# Patient Record
Sex: MALE | URBAN - METROPOLITAN AREA
[De-identification: names, ages, dates, MRNs, and addresses within clinical notes are randomized per-mention and may not be internally consistent; named-entity substitution may affect disease eponyms.]

---

## 2017-02-22 NOTE — PATIENT DISCUSSION
(H10.021) Other mucopurulent conjunctivitis, right eye - Assesment : Examination revealed other mucopurulent conjunctivitis. Likely bacterial. - Plan : Start Tobradex tid OD for 1 week. E-Rx sent to pharmacy. Can use in OS if symptoms start in OS. Advised Pt to wash hands after touching face and eyes, do not share towels or pillows. Advised may take a few weeks to resolve completely. Call if worsens, does not improve, or new symptoms or vision changes occur. RTC in June for Exam and RAUL [address] 74, sooner if problems or changes occur.

## 2017-02-22 NOTE — PATIENT DISCUSSION
(E74.323) Keratoconjunct sicca, not specified as Sjogren's, bilateral - Assesment : Examination revealed Dry Eye Syndrome - Plan : Monitor for changes. Recommended ATs daily 3-4 times per day and Gel ATs qhs. Systane Balance sample and Gel coupon given today. Continue Restasis bid OU.

## 2017-03-01 NOTE — PATIENT DISCUSSION
(O95.723) Edema of right lower eyelid - Assesment : Examination revealed edema of the eyelid vs allergy to Tobradex. - Plan : Discontinue Tobradex and Restasis. Continue hot compresses and use PF ATs daily 3-4 times. PF Refresh Optive Advance sample given. Call if worsens, does not improve, or new symptoms, or vision changes occur. RTC in 1 week for f/u, sooner if problems or changes.

## 2017-03-01 NOTE — PATIENT DISCUSSION
(Q27.093) Keratoconjunct sicca, not specified as Sjogren's, bilateral - Assesment : Examination revealed Dry Eye Syndrome  Pt uses Restasis but has not noticed improvement. - Plan : Monitor for changes. Discontinue Restasis as not helping and may be irritating. Continue hot compresses and PF ATs. Discussed possibility of starting Avenova Spray or Pt may consider Lid Hygiene Procedure with Shikha Salazar. Information given to Pt about Lid Hygiene Treatment.

## 2017-03-07 NOTE — PATIENT DISCUSSION
(H01.001) Unspecified blepharitis right upper eyelid - Assesment : Examination revealed Blepharitis. - Plan : RTC for [place] with Jenny Alarcon- discussed with pt, pt wishes to proceed.

## 2017-03-07 NOTE — PATIENT DISCUSSION
(U77.942) Keratoconjunct sicca, not specified as Sjogren's, bilateral - Assesment : Examination revealed Dry Eye Syndrome with mild corneal staining today. Pt states Restasis has not given much relief. - Plan : Monitor for changes. Advised patient to call our office with decreased vision or increased symptoms. D/C Restasis. Continue Systane- increased to TID-QID OU.

## 2017-03-07 NOTE — PATIENT DISCUSSION
(H01.004) Unspecified blepharitis left upper eyelid - Assesment : Examination revealed Blepharitis. - Plan : see plan #3.

## 2017-03-07 NOTE — PATIENT DISCUSSION
(D85.392) Edema of right lower eyelid - Assesment : Examination revealed edema of the eyelid vs allergy to Tobradex- now resolved off of Tobradex. - Plan : Monitor for changes. Pt to call with increased symptoms or decreased vision or other problems. Stay of Tobradex. D/C Restasis. Continue Systane- increased to TID-QID OU. RTC for Lid Hygiene Treatment.

## 2017-03-09 NOTE — PATIENT DISCUSSION
(H01.004) Unspecified blepharitis left upper eyelid - Assesment : Examination revealed Blepharitis.  - Plan : see plan #1

## 2017-03-09 NOTE — PATIENT DISCUSSION
(H01.001) Unspecified blepharitis right upper eyelid - Assesment : Examination revealed Blepharitis. - Plan : Lid Hygiene tx/Meibomian gland expression performed in minor surgery today. Pt to call with increased symptoms, decreased vision, or other ocular problems. Eder mask OU QD x 2-4 weeks, then PRN. Blephadex foam OU QD x 2-4 weeks, then PRN. Continue Systane OU TID-QID. EZ Tears Vitamin 1 PO QD. RTC for next Lid Hygiene tx OU in 4-5 mos. RTC as scheduled 6/2017 with Levine, Susan. \[facility].\"".

## 2017-03-13 NOTE — PATIENT DISCUSSION
(H01.001) Unspecified blepharitis right upper eyelid - Assesment : Examination revealed Blepharitis. Lid hygiene treatment performed 3/9/17. Patient reports swelling and itching of eyelids with Blephadex. Recommend steroid amy for eye to help with swelling and itching. Blephadex sensitivity. - Plan : Pt to call with increased symptoms, decreased vision, or other ocular problems. Start Maxitrol amy BID OU to lids and some inside the eye. Use for 4 days, then D/C Use cool soaks for now. When swelling decreases, start warm compresses. Eder mask OU QD x 2-4 weeks, then PRN. D/C Blephadex foam OU Continue Systane OU TID-QID. EZ Tears Vitamin 1 PO QD. RTC for next Lid Hygiene tx OU in 4-5 mos with Ocusoft Foam. RTC as scheduled 6/2017 with [facility].

## 2017-04-03 NOTE — PATIENT DISCUSSION
(Q71.152) Keratoconjunct sicca, not specified as Sjogren's, bilateral - Assesment : Examination revealed Dry Eye Syndrome- improved with EZ Tears Vitamin. - Plan : Monitor for changes. Advised patient to call our office with decreased vision or increased symptoms. Continue EZ Tears Vit 1 PO QD. Continue AT's OU TID-QID,-try Thera Tears (complains of film over va w/Systane), sample given.

## 2017-04-03 NOTE — PATIENT DISCUSSION
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 - Plan : Monitor for changes. Pt to call with increased symptoms, decreased vision, or other problems. EES amy OU QHS x 1 week. Continue Eder Mask OU QD-BID. RTC as scheduled for EXAM 6/2017 with [facility]. RTC as scheduled for Lid Hygiene Tx (w/Ocusoft foam).

## 2017-04-11 NOTE — PATIENT DISCUSSION
(U11.488) Keratoconjunct sicca, not specified as Sjogren's, bilateral - Assesment : Examination revealed Dry Eye Syndrome- Has been taking EZ Tears Vitamin for about 3 weeks. - Plan : Monitor for changes. Continue EZ Tears Vit 1 PO QD. Continue AT's OU TID-QID,- recommended trying Thera Tears.

## 2017-04-11 NOTE — PATIENT DISCUSSION
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 Pt felt Systane Ultra was too oily and felt Genteal was too oily also. Pt tried Zaditor in past. Pt has been taking EZ Tears Supplements for about 3 weeks. - Plan : Monitor for changes. Start Doxycyline 50 mg PO qdaily. E-Rx sent to pharmacy. Advised Doxy can cause sensitivity to the sun and should use sun protection while taking. Start Bepreve bid OU, sample given. Continue hot compresses and follow with Avenova bid to Lids, sample given today. Call if symptoms worsen, do not improve or new symptoms or vision changes occur. RTC in 3-4 weeks for f/u, sooner if problems.

## 2017-04-14 NOTE — PATIENT DISCUSSION
(O60.053) Keratoconjunct sicca, not specified as Sjogren's, bilateral - Assesment : Examination revealed Dry Eye Syndrome  Has been taking EZ Tears Vitamin for about 3 weeks. - Plan : Monitor for changes. Continue EZ Tears Supplements. Continue PF AT's at least TID-QID. PF Refresh Optive Advance sample given and coupon given today.

## 2017-04-14 NOTE — PATIENT DISCUSSION
(L03.211) Cellulitis of face - Assesment : Pre-septal cellulitis left- no orbital involvement at this time. - Plan : Stop Avenova and Doxycycline. Start Keflex 500mg PO bid for one week. Warned of symptoms of worsening such as fever, pain, proptosis, double vision to call or go to ER. E-Rx sent to the pharmacy. Stress importance of calling with worsening or other symptoms warned about. RTC on Monday for f/u, sooner if problems or changes occur.

## 2017-04-14 NOTE — PATIENT DISCUSSION
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 Pt has been taking EZ Tears Supplements for about 3 weeks. Pt has tried Maxitrol and Tobradex without improvement and Lid Hygiene Treatment without improvement, had allergic reaction to Blephadex Foam. - Plan : Monitor for changes. Hold Doxycyline and [place] for now. Refer to Dr. Supriya Yang for evaluation and possible treatment.

## 2017-04-18 NOTE — PATIENT DISCUSSION
(I71.555) Keratoconjunct sicca, not specified as Sjogren's, bilateral - Assesment : Examination revealed Dry Eye Syndrome - Pt noticing some improvement in symptoms. Pt reports uses hot compresses bid and uses Avenova after hot compresses bid, Refresh Optive Advance 2-3 times per day, EZ tears, and GenTeal Gel qhs. - Plan : Monitor for changes. Continue EZ Tears Supplements, PF AT's, GenTeal Gel qhs, and hot compresses followed by Avenova bid.

## 2017-04-18 NOTE — PATIENT DISCUSSION
(L03.211) Cellulitis of face - Assesment : Pre-septal cellulitis left- much improved today, almost resolved. Will complete the week of Keflex. - Plan : Monitor. Continue Keflex 500mg PO bid for the total of one week. Call if symptoms worsen or new symptoms or vision changes occur. RTC in 2 weeks for f/u, sooner if problems or changes occur.

## 2017-04-18 NOTE — PATIENT DISCUSSION
(H01.012) Ulcerative blepharitis right lower eyelid - Assesment : Examination revealed Ulcerative Blepharitis. Pt noticing some improvement in symptoms. Pt reports uses hot compresses bid and uses Avenova after hot compresses bid, Refresh Optive Advance 2-3 times per day, EZ tears, and GenTeal Gel qhs. - Plan : Monitor for changes. Continue EZ Tears Supplements, PF AT's, GenTeal Gel qhs, and hot compresses followed by Avenova bid. RTC in 2 weeks for f/u, sooner if problems.

## 2017-04-18 NOTE — PATIENT DISCUSSION
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 Pt noticing some improvement in symptoms. Pt reports uses hot compresses bid and uses Avenova after hot compresses bid, Refresh Optive Advance 2-3 times per day, EZ tears, and GenTeal Gel qhs. - Plan : Monitor for changes. Continue EZ Tears Supplements, PF AT's, GenTeal Gel qhs, and hot compresses followed by Avenova bid. RTC in 2 weeks for f/u, sooner if problems.

## 2017-06-05 NOTE — PATIENT DISCUSSION
(E11.9) Type 2 diabetes mellitus without complications - Assesment : Patient has type II diabetes with no ocular complications. - Plan : Continue care with PCP. Monitor blood sugar and A1C levels for changes. Recommended yearly dilated eye exams to monitor for ocular changes. Letter sent to PCP with today's findings.

## 2017-06-05 NOTE — PATIENT DISCUSSION
(K83.584) Other secondary cataract, bilateral - Assesment : Mild posterior capsule opacification present. - Plan : Monitor for Changes. Advised patient to call our office with decreased vision or increased symptoms. RTC in 1 year for Exam, sooner if problems or changes occur.

## 2017-06-05 NOTE — PATIENT DISCUSSION
(Y92.589) Keratoconjunct sicca, not specified as Sjogren's, bilateral - Assesment : Examination revealed Dry Eye Syndrome. Pt reports much improvement in symptoms since started EZ Tears supplements. - Plan : Monitor for changes. Continue EZ Tears Supplements, PF AT's, GenTeal Gel qhs, and hot compresses followed by Avenova bid.

## 2019-05-13 ENCOUNTER — IMPORTED ENCOUNTER (OUTPATIENT)
Dept: URBAN - METROPOLITAN AREA CLINIC 9 | Facility: CLINIC | Age: 59
End: 2019-05-13

## 2019-10-01 ENCOUNTER — IMPORTED ENCOUNTER (OUTPATIENT)
Dept: URBAN - METROPOLITAN AREA CLINIC 9 | Facility: CLINIC | Age: 59
End: 2019-10-01

## 2019-10-14 ENCOUNTER — IMPORTED ENCOUNTER (OUTPATIENT)
Dept: URBAN - METROPOLITAN AREA CLINIC 9 | Facility: CLINIC | Age: 59
End: 2019-10-14

## 2019-10-24 ENCOUNTER — IMPORTED ENCOUNTER (OUTPATIENT)
Dept: URBAN - METROPOLITAN AREA CLINIC 9 | Facility: CLINIC | Age: 59
End: 2019-10-24

## 2019-11-04 ENCOUNTER — IMPORTED ENCOUNTER (OUTPATIENT)
Dept: URBAN - METROPOLITAN AREA CLINIC 9 | Facility: CLINIC | Age: 59
End: 2019-11-04

## 2019-11-07 ENCOUNTER — IMPORTED ENCOUNTER (OUTPATIENT)
Dept: URBAN - METROPOLITAN AREA CLINIC 9 | Facility: CLINIC | Age: 59
End: 2019-11-07

## 2019-11-07 PROBLEM — Z98.890: Noted: 2019-11-07

## 2020-02-04 NOTE — PATIENT DISCUSSION
Start Besivance tid Os for 1 week. Samples given today. Continue Cephelaxin 500mg PO bid as prescribed.  Advised Pt to call with any new or worsening symptoms, vision changes, or other concerns.

## 2020-02-06 NOTE — PATIENT DISCUSSION
Pre-septal cellulitis with dacryocystitis worsening with PO Keflex. Pt denies diplopia, fever or pain.

## 2020-02-06 NOTE — PATIENT DISCUSSION
Pt seen with Dr. Alba Collado who suggests an injection of Rocephin (he recommends going to urgent care if he does not have a PCP here) for extra antibiotic coverage. Change Keflex to Augmentin 500mg PO bid for one week (Erx sent).

## 2020-02-06 NOTE — PATIENT DISCUSSION
Continue Besivance tid OS. Samples given today. Advised Pt to call with any new or worsening symptoms, vision changes, or other concerns.

## 2020-02-06 NOTE — PATIENT DISCUSSION
Advised Pt to go to ER if having any pain, vision changes, bulging of eye, fevers,or other concerns.

## 2020-03-04 NOTE — PATIENT DISCUSSION
Pt responded well to Augmentin in past. Pt did not see Dr. Radha Akins for eval. Pt leaving to go [facility] in 2 weeks.

## 2020-03-05 NOTE — PATIENT DISCUSSION
Pt did not see Dr. Cathy Perdomo for evaluation. Pt leaving to go [facility] in [place] in 2 weeks. Patient need to see someone that can do a DCR procedure.

## 2020-03-05 NOTE — PATIENT DISCUSSION
Recommend warm compresses and massage to tear sac as directed BID. Recommend Doxycycline  MG BID for 10 days, and continue Besivance TID OS.

## 2021-10-16 ASSESSMENT — VISUAL ACUITY
OD_CC: 20/60 SN
OD_CC: 20/70 SN
OS_CC: 20/80 SN
OD_CC: 20/60 SN
OS_CC: 20/200 SN
OS_SC: CF 2FT SN
OS_SC: 20/16 - ET
OS_CC: 20/80 SN
OD_SC: CF 2FT SN
OS_SC: 20/30 SN
OS_CC: 20/100 SN
OD_SC: 20/20 ET

## 2021-10-16 ASSESSMENT — KERATOMETRY
OS_K1POWER_DIOPTERS: 42.75
OS_AXISANGLE_DEGREES: 28
OD_K1POWER_DIOPTERS: 43
OD_AXISANGLE2_DEGREES: 90
OS_AXISANGLE_DEGREES: 28
OD_AXISANGLE_DEGREES: 180
OS_K2POWER_DIOPTERS: 43.25
OS_K2POWER_DIOPTERS: 43.5
OS_AXISANGLE2_DEGREES: 118
OD_K2POWER_DIOPTERS: 43
OD_K2POWER_DIOPTERS: 43.25
OD_AXISANGLE_DEGREES: 10
OS_AXISANGLE2_DEGREES: 118
OD_AXISANGLE2_DEGREES: 100
OS_K1POWER_DIOPTERS: 42.75
OS_AXISANGLE2_DEGREES: 105
OD_AXISANGLE_DEGREES: 22
OD_K1POWER_DIOPTERS: 42.5
OS_K1POWER_DIOPTERS: 42.75
OD_K1POWER_DIOPTERS: 42.75
OS_K2POWER_DIOPTERS: 43.25
OD_K2POWER_DIOPTERS: 43.25
OD_AXISANGLE2_DEGREES: 112
OS_AXISANGLE_DEGREES: 15

## 2021-10-16 ASSESSMENT — TONOMETRY
OS_IOP_MMHG: 12
OD_IOP_MMHG: 12
OS_IOP_MMHG: 10
OD_IOP_MMHG: 11
OS_IOP_MMHG: 12
OD_IOP_MMHG: 11
OD_IOP_MMHG: 12
OS_IOP_MMHG: 12

## 2022-10-24 ENCOUNTER — ESTABLISHED PATIENT (OUTPATIENT)
Dept: URBAN - METROPOLITAN AREA CLINIC 14 | Facility: CLINIC | Age: 62
End: 2022-10-24

## 2022-10-24 DIAGNOSIS — Z96.1: ICD-10-CM

## 2022-10-24 DIAGNOSIS — H26.493: ICD-10-CM

## 2022-10-24 PROCEDURE — 99214 OFFICE O/P EST MOD 30 MIN: CPT

## 2022-10-24 ASSESSMENT — VISUAL ACUITY
OU_SC: 20/30
OD_SC: 20/400
OS_BCVA: 20/25
OS_GLARE: 20/40
OS_SC: 20/50

## 2022-10-24 ASSESSMENT — TONOMETRY
OS_IOP_MMHG: 12
OD_IOP_MMHG: 12

## 2022-12-22 ENCOUNTER — POST-OP (OUTPATIENT)
Dept: URBAN - METROPOLITAN AREA CLINIC 14 | Facility: CLINIC | Age: 62
End: 2022-12-22

## 2022-12-22 PROCEDURE — 99024 POSTOP FOLLOW-UP VISIT: CPT

## 2022-12-22 ASSESSMENT — VISUAL ACUITY
OS_BCVA: 20/20
OD_BCVA: 20/25
OS_SC: 20/25
OD_SC: 20/25

## 2022-12-22 ASSESSMENT — TONOMETRY
OD_IOP_MMHG: 9
OS_IOP_MMHG: 10
